# Patient Record
Sex: FEMALE | Race: WHITE
[De-identification: names, ages, dates, MRNs, and addresses within clinical notes are randomized per-mention and may not be internally consistent; named-entity substitution may affect disease eponyms.]

---

## 2018-07-15 ENCOUNTER — HOSPITAL ENCOUNTER (EMERGENCY)
Dept: HOSPITAL 46 - ED | Age: 58
End: 2018-07-15
Payer: COMMERCIAL
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
## 2018-07-15 DIAGNOSIS — S06.6X1A: Primary | ICD-10-CM

## 2018-07-15 DIAGNOSIS — F10.129: ICD-10-CM

## 2018-07-15 DIAGNOSIS — S06.2X1A: ICD-10-CM

## 2018-07-15 DIAGNOSIS — S01.01XA: ICD-10-CM

## 2018-07-15 PROCEDURE — 0HQ0XZZ REPAIR SCALP SKIN, EXTERNAL APPROACH: ICD-10-PCS | Performed by: EMERGENCY MEDICINE

## 2018-07-15 PROCEDURE — G0480 DRUG TEST DEF 1-7 CLASSES: HCPCS

## 2018-07-15 NOTE — EKG
[facility]
                                    [address] [facility] [facility]
                                  [place], [place]  [address]
_________________________________________________________________________________________
                                                                 Signed   
 
 
Normal sinus rhythm
Normal ECG
No previous ECGs available
Confirmed by JOAN BAI MD ([id number]) on 7/15/2018 2:03:12 PM
 
 
 
 
 
 
 
 
 
 
 
 
 
 
 
 
 
 
 
 
 
 
 
 
 
 
 
 
 
 
 
 
 
 
 
 
 
 
 
 
 
    Electronically Signed By: JOAN BAI MD  07/15/18 1403
_________________________________________________________________________________________
PATIENT NAME:     NICOLÁS JETT                         
MEDICAL RECORD #: [id number]                     Electrocardiogram             
          ACCT #: [id number]  
DATE OF BIRTH:   [birth year]                                       
PHYSICIAN:   JOAN BAI MD           REPORT #: [id number]
REPORT IS CONFIDENTIAL AND NOT TO BE RELEASED WITHOUT AUTHORIZATION